# Patient Record
Sex: MALE | NOT HISPANIC OR LATINO | Employment: FULL TIME | URBAN - METROPOLITAN AREA
[De-identification: names, ages, dates, MRNs, and addresses within clinical notes are randomized per-mention and may not be internally consistent; named-entity substitution may affect disease eponyms.]

---

## 2025-05-16 ENCOUNTER — OFFICE VISIT (OUTPATIENT)
Dept: PRIMARY CARE | Facility: CLINIC | Age: 55
End: 2025-05-16
Payer: COMMERCIAL

## 2025-05-16 ENCOUNTER — APPOINTMENT (OUTPATIENT)
Dept: PRIMARY CARE | Facility: CLINIC | Age: 55
End: 2025-05-16
Payer: COMMERCIAL

## 2025-05-16 VITALS
WEIGHT: 192 LBS | SYSTOLIC BLOOD PRESSURE: 118 MMHG | RESPIRATION RATE: 18 BRPM | BODY MASS INDEX: 30.13 KG/M2 | DIASTOLIC BLOOD PRESSURE: 78 MMHG | OXYGEN SATURATION: 98 % | HEIGHT: 67 IN | HEART RATE: 71 BPM

## 2025-05-16 DIAGNOSIS — Z23 IMMUNIZATION DUE: ICD-10-CM

## 2025-05-16 DIAGNOSIS — N53.19 OTHER EJACULATORY DYSFUNCTION: Primary | ICD-10-CM

## 2025-05-16 DIAGNOSIS — E03.9 ACQUIRED HYPOTHYROIDISM: ICD-10-CM

## 2025-05-16 DIAGNOSIS — Z00.00 ROUTINE GENERAL MEDICAL EXAMINATION AT A HEALTH CARE FACILITY: ICD-10-CM

## 2025-05-16 DIAGNOSIS — Z12.5 PROSTATE CANCER SCREENING: ICD-10-CM

## 2025-05-16 DIAGNOSIS — E78.6 LOW HDL (UNDER 40): ICD-10-CM

## 2025-05-16 PROBLEM — F32.A DEPRESSION: Status: ACTIVE | Noted: 2025-05-16

## 2025-05-16 RX ORDER — LEVOTHYROXINE SODIUM 150 UG/1
150 TABLET ORAL DAILY
COMMUNITY

## 2025-05-16 RX ORDER — BUPROPION HYDROCHLORIDE 300 MG/1
300 TABLET ORAL DAILY
COMMUNITY

## 2025-05-16 RX ORDER — TAMSULOSIN HYDROCHLORIDE 0.4 MG/1
0.4 CAPSULE ORAL
COMMUNITY
Start: 2025-05-15

## 2025-05-16 RX ORDER — SILDENAFIL 50 MG/1
TABLET, FILM COATED ORAL
COMMUNITY

## 2025-05-16 ASSESSMENT — ENCOUNTER SYMPTOMS
RHINORRHEA: 0
FEVER: 0
CHILLS: 0
FATIGUE: 0
ARTHRALGIAS: 0
SLEEP DISTURBANCE: 0
SHORTNESS OF BREATH: 0
LIGHT-HEADEDNESS: 0
DYSPHORIC MOOD: 0
WHEEZING: 0
HEADACHES: 0
PALPITATIONS: 0
CONSTIPATION: 0
NERVOUS/ANXIOUS: 0
POLYDIPSIA: 0
VOMITING: 0
FREQUENCY: 0
DYSURIA: 0
SINUS PRESSURE: 0
MYALGIAS: 0
SINUS PAIN: 0
DIARRHEA: 0
NAUSEA: 0
COUGH: 0
WOUND: 0
DIZZINESS: 0

## 2025-05-16 ASSESSMENT — ANXIETY QUESTIONNAIRES
IF YOU CHECKED OFF ANY PROBLEMS ON THIS QUESTIONNAIRE, HOW DIFFICULT HAVE THESE PROBLEMS MADE IT FOR YOU TO DO YOUR WORK, TAKE CARE OF THINGS AT HOME, OR GET ALONG WITH OTHER PEOPLE: NOT DIFFICULT AT ALL
GAD7 TOTAL SCORE: 1
7. FEELING AFRAID AS IF SOMETHING AWFUL MIGHT HAPPEN: NOT AT ALL
1. FEELING NERVOUS, ANXIOUS, OR ON EDGE: SEVERAL DAYS
2. NOT BEING ABLE TO STOP OR CONTROL WORRYING: NOT AT ALL
4. TROUBLE RELAXING: NOT AT ALL
3. WORRYING TOO MUCH ABOUT DIFFERENT THINGS: NOT AT ALL
6. BECOMING EASILY ANNOYED OR IRRITABLE: NOT AT ALL
5. BEING SO RESTLESS THAT IT IS HARD TO SIT STILL: NOT AT ALL

## 2025-05-16 ASSESSMENT — PATIENT HEALTH QUESTIONNAIRE - PHQ9
SUM OF ALL RESPONSES TO PHQ9 QUESTIONS 1 AND 2: 0
1. LITTLE INTEREST OR PLEASURE IN DOING THINGS: NOT AT ALL
2. FEELING DOWN, DEPRESSED OR HOPELESS: NOT AT ALL

## 2025-05-16 ASSESSMENT — PAIN SCALES - GENERAL: PAINLEVEL_OUTOF10: 0-NO PAIN

## 2025-05-16 ASSESSMENT — COLUMBIA-SUICIDE SEVERITY RATING SCALE - C-SSRS: 1. IN THE PAST MONTH, HAVE YOU WISHED YOU WERE DEAD OR WISHED YOU COULD GO TO SLEEP AND NOT WAKE UP?: NO

## 2025-05-16 NOTE — PROGRESS NOTES
Etienne Velez is a 54 y.o. male who is presenting for New Patient Visit (Pt is to establish care and for annual physical) and Erectile Dysfunction    Here today to establish care.     Patient has been having ejaculatory problems.  Is able to achieve and maintain an erection, is able to achieve orgasm, but is unable to ejaculate.  Symptoms started in last few weeks.   Has not noticed any lumps, bumps, or masses.  Sexually active with spouse only.     Has been getting compounded tirzepatide injections.  Not adding any other meds to the injections.     Last  Visit: 1.5  Reported Health: Good    Dental, Vision, Hearing:  Regular dental visits: yes  - Brushes teeth 2 times per day  - Flosses? no  Vision problems: no  - Wears glasses or contacts? yes  - Last eye exam: UTD  Hearing loss: yes - trouble in crowds.     Immunization status:  Up to date: no    Lifestyle:  Healthy diet: yes  Regular exercise: no  Alcohol: yes  Tobacco: no  Drugs: no    Reproductive Health:  Sexually active: yes  Contraception: vasectomy  Erectile dysfunction: no    Colorectal Cancer Screening:  Last colonoscopy or Cologuard: 9/2023  Prostate Cancer Screening:  Last PSA: 7/2024  Metabolic Screening:  Lipid profile: 1/2025  Glucose screen: 1/2025    Review of Systems   Constitutional:  Negative for chills, fatigue and fever.   HENT:  Negative for congestion, hearing loss, rhinorrhea, sinus pressure, sinus pain and tinnitus.    Eyes:  Negative for visual disturbance.   Respiratory:  Negative for cough, shortness of breath and wheezing.    Cardiovascular:  Negative for chest pain, palpitations and leg swelling.   Gastrointestinal:  Negative for constipation, diarrhea, nausea and vomiting.   Endocrine: Negative for cold intolerance, heat intolerance, polydipsia and polyuria.   Genitourinary:  Negative for dysuria, frequency and urgency.   Musculoskeletal:  Negative for arthralgias and myalgias.   Skin:  Negative for pallor, rash and wound.  "  Neurological:  Negative for dizziness, light-headedness and headaches.   Psychiatric/Behavioral:  Negative for dysphoric mood and sleep disturbance. The patient is not nervous/anxious.        Previous History  Medical History[1]  Surgical History[2]  Social History[3]  Family History[4]  Allergies[5]  Current Outpatient Medications   Medication Instructions    buPROPion XL (WELLBUTRIN XL) 300 mg, oral, Daily    levothyroxine (SYNTHROID, LEVOXYL) 150 mcg, oral, Daily    sildenafil (Viagra) 50 mg tablet TAKE 1 TABLET BY MOUTH AS NEEDED 30-60 MIN BEFORE SEXUAL INTERCOURSE. NO MORE THAN 1 TAB PER 24 HOUR    tamsulosin (FLOMAX) 0.4 mg       Visit Vitals  /78 (BP Location: Left arm, Patient Position: Sitting)   Pulse 71   Resp 18   Ht 1.689 m (5' 6.5\")   Wt 87.1 kg (192 lb)   SpO2 98%   BMI 30.53 kg/m²   Smoking Status Never   BSA 2.02 m²       Physical Exam  Constitutional:       Appearance: Normal appearance. He is obese.   HENT:      Head: Normocephalic and atraumatic.      Right Ear: Tympanic membrane, ear canal and external ear normal.      Left Ear: Tympanic membrane, ear canal and external ear normal.      Nose: Nose normal.      Mouth/Throat:      Mouth: Mucous membranes are moist.      Pharynx: Oropharynx is clear.   Eyes:      Extraocular Movements: Extraocular movements intact.      Conjunctiva/sclera: Conjunctivae normal.      Pupils: Pupils are equal, round, and reactive to light.   Cardiovascular:      Rate and Rhythm: Normal rate and regular rhythm.      Pulses: Normal pulses.      Heart sounds: Normal heart sounds.   Pulmonary:      Effort: Pulmonary effort is normal.      Breath sounds: Normal breath sounds.   Abdominal:      General: There is no distension.      Palpations: Abdomen is soft.      Tenderness: There is no abdominal tenderness.   Musculoskeletal:         General: Normal range of motion.   Skin:     General: Skin is warm and dry.   Neurological:      Mental Status: He is alert and " oriented to person, place, and time. Mental status is at baseline.   Psychiatric:         Mood and Affect: Mood normal.         Behavior: Behavior normal.       Assessment & Plan  Other ejaculatory dysfunction    Orders:    Referral to Urology; Future  Etiology is unclear.  Could represent retrograde ejaculation.  Would appreciate urology input on the subject.  Low HDL (under 40)    Orders:    Lipid panel; Future    Comprehensive metabolic panel; Future    Prostate cancer screening    Orders:    PSA; Future    Acquired hypothyroidism    Orders:    TSH; Future    Immunization due    Orders:    Zoster vaccine, recombinant, adult (SHINGRIX)    Routine general medical examination at a health care facility       Immunizations reviewed and updated today.  Cancer screenings reviewed.  None due at this time.  Basics of healthy diet and exercise reviewed.  Patient attempt to follow a healthy diet.  Needs to work on improving exercise.  Sexual practices reviewed.  Sexually active with spouse.  Has had vasectomy so contraception is not a concern.  Substance history reviewed.  No red flags identified.    Reviewed and approved by BUZZ CHARLES on 5/17/25 at 1:30 PM.         [1] No past medical history on file.  [2] No past surgical history on file.  [3]   Social History  Tobacco Use    Smoking status: Never    Smokeless tobacco: Never   Substance Use Topics    Alcohol use: Yes    Drug use: Not Currently   [4]   Family History  Problem Relation Name Age of Onset    Cancer Mother Maryam    [5] No Known Allergies

## 2025-06-12 ENCOUNTER — PATIENT MESSAGE (OUTPATIENT)
Dept: PRIMARY CARE | Facility: CLINIC | Age: 55
End: 2025-06-12
Payer: COMMERCIAL

## 2025-06-12 DIAGNOSIS — N53.19 OTHER EJACULATORY DYSFUNCTION: ICD-10-CM

## 2025-06-13 RX ORDER — SILDENAFIL 50 MG/1
50 TABLET, FILM COATED ORAL AS NEEDED
Qty: 10 TABLET | Refills: 3 | Status: SHIPPED | OUTPATIENT
Start: 2025-06-13

## 2025-06-16 ENCOUNTER — APPOINTMENT (OUTPATIENT)
Dept: UROLOGY | Facility: HOSPITAL | Age: 55
End: 2025-06-16
Payer: COMMERCIAL

## 2025-07-31 ENCOUNTER — TELEPHONE (OUTPATIENT)
Dept: PRIMARY CARE | Facility: CLINIC | Age: 55
End: 2025-07-31
Payer: COMMERCIAL

## 2025-07-31 NOTE — TELEPHONE ENCOUNTER
You can use my fast pass tomorrow.  Make sure he can bring a copy of labs.    Make sure he can keep down water, at least.  If not, please send to ER.

## 2025-07-31 NOTE — TELEPHONE ENCOUNTER
Patient called and stated he was told by his weight loss Doctor to schedule an appointment with his PCP due to his Lipase levels. Patient would like to know if he could see another provider to go over labs for his labs due to [facility] not having availability tomorrow, August 1, 2025. Patient is worried that this particular lab is elevated and he's taking weight loss medication. [facility] only has same day slots until 08-06-25.    Please advise    Patient can be reached at [phone]

## 2025-08-01 NOTE — TELEPHONE ENCOUNTER
Called and spoke to Patient he stated he's off on Fridays, he scheduled for 08-15-25. He stated he can keep down water. Is this too long to wait? Please advise

## 2025-08-04 ENCOUNTER — OFFICE VISIT (OUTPATIENT)
Dept: PRIMARY CARE | Facility: CLINIC | Age: 55
End: 2025-08-04
Payer: COMMERCIAL

## 2025-08-04 VITALS
DIASTOLIC BLOOD PRESSURE: 62 MMHG | WEIGHT: 191 LBS | OXYGEN SATURATION: 94 % | SYSTOLIC BLOOD PRESSURE: 120 MMHG | BODY MASS INDEX: 30.7 KG/M2 | HEIGHT: 66 IN | HEART RATE: 71 BPM

## 2025-08-04 DIAGNOSIS — K85.30 DRUG-INDUCED ACUTE PANCREATITIS WITHOUT INFECTION OR NECROSIS (HHS-HCC): Primary | ICD-10-CM

## 2025-08-04 DIAGNOSIS — E66.811 CLASS 1 OBESITY: ICD-10-CM

## 2025-08-04 PROCEDURE — 3008F BODY MASS INDEX DOCD: CPT | Performed by: STUDENT IN AN ORGANIZED HEALTH CARE EDUCATION/TRAINING PROGRAM

## 2025-08-04 PROCEDURE — 1036F TOBACCO NON-USER: CPT | Performed by: STUDENT IN AN ORGANIZED HEALTH CARE EDUCATION/TRAINING PROGRAM

## 2025-08-04 PROCEDURE — 99214 OFFICE O/P EST MOD 30 MIN: CPT | Performed by: STUDENT IN AN ORGANIZED HEALTH CARE EDUCATION/TRAINING PROGRAM

## 2025-08-04 ASSESSMENT — PATIENT HEALTH QUESTIONNAIRE - PHQ9
1. LITTLE INTEREST OR PLEASURE IN DOING THINGS: NOT AT ALL
2. FEELING DOWN, DEPRESSED OR HOPELESS: NOT AT ALL
SUM OF ALL RESPONSES TO PHQ9 QUESTIONS 1 AND 2: 0

## 2025-08-04 ASSESSMENT — ENCOUNTER SYMPTOMS
ABDOMINAL DISTENTION: 0
ABDOMINAL PAIN: 0
BACK PAIN: 0
NAUSEA: 0
VOMITING: 0

## 2025-08-04 ASSESSMENT — PAIN SCALES - GENERAL: PAINLEVEL_OUTOF10: 0-NO PAIN

## 2025-08-04 ASSESSMENT — COLUMBIA-SUICIDE SEVERITY RATING SCALE - C-SSRS: 1. IN THE PAST MONTH, HAVE YOU WISHED YOU WERE DEAD OR WISHED YOU COULD GO TO SLEEP AND NOT WAKE UP?: NO

## 2025-08-04 NOTE — PROGRESS NOTES
"Subjective   Patient ID: Etienne Velez is a 55 y.o. male who presents for Follow-up (Labs /Light headed when squatting down ).    Here today after being sent in from weight loss clinic due to elevated lipase.  Has been prescribed tirzepatide.  This is the first time he has had an episode of pancreatitis with this medication.  Had a prior episode of pancreatitis with Wegovy in January which was managed by the weight loss clinic.              Review of Systems   Gastrointestinal:  Negative for abdominal distention, abdominal pain, nausea and vomiting.   Musculoskeletal:  Negative for back pain.   All other systems reviewed and are negative.      Objective     Visit Vitals  /62   Pulse 71   Ht 1.676 m (5' 6\")   Wt 86.6 kg (191 lb)   SpO2 94%   BMI 30.83 kg/m²   Smoking Status Never   BSA 2.01 m²       Wt Readings from Last 5 Encounters:   08/04/25 86.6 kg (191 lb)   05/16/25 87.1 kg (192 lb)       Physical Exam  Constitutional:       Appearance: Normal appearance. He is obese.   HENT:      Head: Normocephalic and atraumatic.     Cardiovascular:      Rate and Rhythm: Normal rate and regular rhythm.      Heart sounds: Normal heart sounds.   Pulmonary:      Effort: Pulmonary effort is normal.      Breath sounds: Normal breath sounds.   Abdominal:      General: There is no distension.      Palpations: Abdomen is soft.      Tenderness: There is no abdominal tenderness. There is no guarding.     Skin:     General: Skin is warm.     Neurological:      Mental Status: He is alert.     Psychiatric:         Mood and Affect: Mood normal.         Behavior: Behavior normal.         Assessment & Plan  Drug-induced acute pancreatitis without infection or necrosis ([facility])  Chronic problem with treatment side effect.  Reviewed outside laboratory results brought in by patient.  Lipase 317 on recent draw.  Patient was taken off of Zepbound as a result.  Not entirely clear why this was drawn on an asymptomatic patient.  Had been on " 15 mg tirzepatide weekly.  Was asymptomatic at time of blood draw, so unclear if this would represent a clinically-significant case of acute pancreatitis, although 5 times the upper limit of normal does exceed the expected effects of this medication on serum lipase.   Orders:    CT abdomen pelvis w IV contrast; Future    Renal function panel; Future    Class 1 obesity  Hold GLP1RA in the short term to allow pancreas to recover.  If resumed in the future, would not recommend 15 mg, although it may be preferable to avoid these agents entirely if possible.                Reviewed and approved by BUZZ CHARLES on 8/4/25 at 9:26 PM.

## 2025-08-15 ENCOUNTER — APPOINTMENT (OUTPATIENT)
Dept: PRIMARY CARE | Facility: CLINIC | Age: 55
End: 2025-08-15
Payer: COMMERCIAL

## 2025-08-19 ENCOUNTER — HOSPITAL ENCOUNTER (OUTPATIENT)
Dept: RADIOLOGY | Facility: HOSPITAL | Age: 55
Discharge: HOME | End: 2025-08-19
Payer: COMMERCIAL

## 2025-08-19 DIAGNOSIS — K85.30 DRUG-INDUCED ACUTE PANCREATITIS WITHOUT INFECTION OR NECROSIS (HHS-HCC): ICD-10-CM

## 2025-08-19 DIAGNOSIS — R39.12 POOR URINARY STREAM: ICD-10-CM

## 2025-08-19 PROCEDURE — 74177 CT ABD & PELVIS W/CONTRAST: CPT | Performed by: RADIOLOGY

## 2025-08-19 PROCEDURE — 2550000001 HC RX 255 CONTRASTS: Performed by: STUDENT IN AN ORGANIZED HEALTH CARE EDUCATION/TRAINING PROGRAM

## 2025-08-19 PROCEDURE — 74177 CT ABD & PELVIS W/CONTRAST: CPT

## 2025-08-19 RX ORDER — TAMSULOSIN HYDROCHLORIDE 0.4 MG/1
0.4 CAPSULE ORAL NIGHTLY
Qty: 90 CAPSULE | Refills: 0 | Status: SHIPPED | OUTPATIENT
Start: 2025-08-19

## 2025-08-19 RX ADMIN — IOHEXOL 75 ML: 350 INJECTION, SOLUTION INTRAVENOUS at 08:31
